# Patient Record
(demographics unavailable — no encounter records)

---

## 2025-01-03 NOTE — HEALTH RISK ASSESSMENT
[Good] : ~his/her~  mood as  good [No] : In the past 12 months have you used drugs other than those required for medical reasons? No [No falls in past year] : Patient reported no falls in the past year [0] : 2) Feeling down, depressed, or hopeless: Not at all (0) [Patient reported mammogram was normal] : Patient reported mammogram was normal [Patient reported PAP Smear was normal] : Patient reported PAP Smear was normal [Patient reported colonoscopy was normal] : Patient reported colonoscopy was normal [HIV test declined] : HIV test declined [Hepatitis C test declined] : Hepatitis C test declined [With Family] : lives with family [Employed] : employed [] :  [Smoke Detector] : smoke detector [Carbon Monoxide Detector] : carbon monoxide detector [Seat Belt] :  uses seat belt [Former] : Former [PHQ-2 Negative - No further assessment needed] : PHQ-2 Negative - No further assessment needed [> 15 Years] : > 15 Years [de-identified] : No [de-identified] : PCP Dr. Nelson , GYN Dr. Moore  [Audit-CScore] : 0 [de-identified] : Little exercises a few times a week  [de-identified] : Healthy [[id number]] : 0 [Reports changes in hearing] : Reports no changes in hearing [Reports changes in vision] : Reports no changes in vision [Reports changes in dental health] : Reports no changes in dental health [Sunscreen] : does not use sunscreen [MammogramDate] : 06/24 [MammogramComments] : [facility] radiologist  [PapSmearDate] : 06/20 [BoneDensityComments] : n/a [ColonoscopyDate] : 02/24 [ColonoscopyComments] : Dr. Thakur  [de-identified] :  and One daughter

## 2025-01-03 NOTE — COUNSELING
[Yes] : Risk of tobacco use and health benefits of smoking cessation discussed: Yes [AUDIT-C Screening administered and reviewed] : AUDIT-C Screening administered and reviewed

## 2025-01-03 NOTE — HISTORY OF PRESENT ILLNESS
[FreeTextEntry1] : Establish care with new provider//CPE  [de-identified] : The patient is a 56-year-old F who presents to the office for an annual wellness examination and follow up of chronic medical conditions.   The patient endorses concern around HLD and cardiac conditions in the family. She considers herself healthy but is concerned given her family history.   Her diet is described as healthy - high in protein and fiber heavy.   Notes job can be stressful. H/o significant anxiety. Is working to improve it.   Routine Health maintenance (as applicable) Mammogram: 2024 Pap Smear: now due  DEXA (65+): n/a Colonoscopy (45+): 2024   COVID vaccine series: declines  Flu: declines  Tdap (19-64): declines  Shingles (50+, immunocompetent): declines  PCV (>66yo/other conditions):

## 2025-01-03 NOTE — ASSESSMENT
[FreeTextEntry1] : The patient is a 56 year old F who presents to the office for an annual wellness examination - Fasting labs to screen for anemia, electrolyte disturbances, DM, lipid disorders, and additional metabolic disorders - PHQ2 performed: 0 points - Immunizations: declines  - Encouraged routine dental, vision, dermatology screenings & age-appropriate physical activity    Where applicable: - Labs drawn in office. - Appropriate medication renewal(s) provided. - Provided scripts for necessary imaging and/or referrals.     Further management to be completed pending lab results and/or imaging studies. All of the patient's questions and concerns were answered in detail.